# Patient Record
Sex: FEMALE | Race: WHITE | Employment: FULL TIME | URBAN - METROPOLITAN AREA
[De-identification: names, ages, dates, MRNs, and addresses within clinical notes are randomized per-mention and may not be internally consistent; named-entity substitution may affect disease eponyms.]

---

## 2020-11-10 ENCOUNTER — TELEPHONE (OUTPATIENT)
Dept: FAMILY MEDICINE | Facility: CLINIC | Age: 25
End: 2020-11-10

## 2020-11-10 NOTE — TELEPHONE ENCOUNTER
General Call:     Who is calling:  Edita     Reason for Call:  Covid Questions     What are your questions or concerns:  Pt  suspected positive for covid after travel, pt has been quarantining and is asymptomatic. Would like to know if she continues to be asymptomatic how long she needs to stay in quarantines.     Date of last appointment with provider: N/A    Okay to leave a detailed message:[facility] Home number on file [phone] (home)

## 2020-11-10 NOTE — TELEPHONE ENCOUNTER
"Called number- \"[facility]\"  No message left.    Arti SKELTONRN BSN  [facility]  [phone]    "

## 2020-11-10 NOTE — TELEPHONE ENCOUNTER
Pt calling states she already s/w the [facility] and was given instruction on their situation.  Pt states her  came back from traveling on 11/2.  11/4 or 11/5 started having covid sxs and they started to quarantine.  According to [facility] pt should quarantine until 11/16 and pt wanting to know if that is true?    Advised pt she should quarantine for 14 days from onset of 's sxs.  If pt comes down with sxs it is another 14 days quarantine for her.    Pt states understanding.    Gabriela DIAZ RN  EP Triage